# Patient Record
Sex: MALE | Race: WHITE | NOT HISPANIC OR LATINO | Employment: PART TIME | URBAN - METROPOLITAN AREA
[De-identification: names, ages, dates, MRNs, and addresses within clinical notes are randomized per-mention and may not be internally consistent; named-entity substitution may affect disease eponyms.]

---

## 2021-01-15 DIAGNOSIS — Z23 NEED FOR VACCINATION: ICD-10-CM

## 2024-01-06 ENCOUNTER — APPOINTMENT (OUTPATIENT)
Dept: RADIOLOGY | Facility: MEDICAL CENTER | Age: 79
End: 2024-01-06
Attending: EMERGENCY MEDICINE
Payer: COMMERCIAL

## 2024-01-06 ENCOUNTER — HOSPITAL ENCOUNTER (EMERGENCY)
Facility: MEDICAL CENTER | Age: 79
End: 2024-01-06
Attending: EMERGENCY MEDICINE
Payer: COMMERCIAL

## 2024-01-06 VITALS
TEMPERATURE: 98.6 F | DIASTOLIC BLOOD PRESSURE: 99 MMHG | WEIGHT: 250 LBS | OXYGEN SATURATION: 94 % | BODY MASS INDEX: 32.08 KG/M2 | HEIGHT: 74 IN | SYSTOLIC BLOOD PRESSURE: 165 MMHG | HEART RATE: 54 BPM | RESPIRATION RATE: 16 BRPM

## 2024-01-06 DIAGNOSIS — S82.391A OTHER CLOSED FRACTURE OF DISTAL END OF RIGHT TIBIA, INITIAL ENCOUNTER: Primary | ICD-10-CM
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
## 2024-01-06 DIAGNOSIS — S93.04XA ANKLE DISLOCATION, RIGHT, INITIAL ENCOUNTER: ICD-10-CM

## 2024-01-06 PROCEDURE — 73610 X-RAY EXAM OF ANKLE: CPT | Mod: RT

## 2024-01-06 PROCEDURE — 27840 TREAT ANKLE DISLOCATION: CPT

## 2024-01-06 PROCEDURE — 73590 X-RAY EXAM OF LOWER LEG: CPT | Mod: RT

## 2024-01-06 PROCEDURE — 27788 TREATMENT OF ANKLE FRACTURE: CPT

## 2024-01-06 PROCEDURE — 29125 APPL SHORT ARM SPLINT STATIC: CPT

## 2024-01-06 PROCEDURE — 99284 EMERGENCY DEPT VISIT MOD MDM: CPT | Mod: 25

## 2024-01-06 RX ORDER — ACETAMINOPHEN 500 MG
1000 TABLET ORAL EVERY 6 HOURS PRN
Qty: 20 TABLET | Refills: 0 | Status: SHIPPED | OUTPATIENT
Start: 2024-01-06 | End: 2024-01-10

## 2024-01-06 RX ORDER — PROPOFOL 10 MG/ML
200 INJECTION, EMULSION INTRAVENOUS ONCE
Status: DISCONTINUED | OUTPATIENT
Start: 2024-01-06 | End: 2024-01-06

## 2024-01-06 RX ORDER — SODIUM CHLORIDE 9 MG/ML
500 INJECTION, SOLUTION INTRAVENOUS
OUTPATIENT
Start: 2024-01-18 | End: 2024-01-18

## 2024-01-06 RX ORDER — IBUPROFEN 800 MG/1
800 TABLET ORAL EVERY 8 HOURS PRN
Qty: 9 TABLET | Refills: 0 | Status: SHIPPED | OUTPATIENT
Start: 2024-01-06 | End: 2024-01-09

## 2024-01-07 NOTE — ED TRIAGE NOTES
Chief Complaint   Patient presents with    T-5000 GLF     Pt bib ems c/o right ankle pain after slipped and fell on ice while going downstairs. -head trauma. +swelling  +deformity of right ankle. Cms intact. +pedal pulses. Aaox4. Unable to ambulate after the fall. C/o numbness in right ankle. Denies any pain upon arrival. States took 3 asa at 11am

## 2024-01-07 NOTE — ED NOTES
Bedside report received from off going RN Cally, assumed care of patient.  POC discussed with patient. Call light within reach, all needs addressed at this time.     Pt  currently being discharged no interventions in place.

## 2024-01-07 NOTE — DISCHARGE INSTRUCTIONS
Unfortunately you have a fairly bad fracture to your ankle.  It is important that you call the orthopedic doctor on Monday morning to make an appointment to be seen for surgical consultation.  The steps of fractures may require surgery.  I want you to take 1000 mg of Tylenol 3 times a day and 800 mg of Motrin 3 times a day.  You can keep the leg elevated to help with swelling and pain.  You need to follow-up with primary care for further evaluation as well.  Return if symptoms worsen.  Thank you for coming in today.

## 2024-01-07 NOTE — ED PROVIDER NOTES
ED Provider Note    Scribed for Alan Ocasio by Aure Huber. 1/6/2024  4:28 PM    Primary care provider: No primary care provider noted  Means of arrival: EMS  History obtained from: Patient  History limited by: None    CHIEF COMPLAINT  Chief Complaint   Patient presents with    T-5000 GLF     Pt bib ems c/o right ankle pain after slipped and fell on ice while going downstairs. -head trauma. +swelling  +deformity of right ankle. Cms intact. +pedal pulses. Aaox4. Unable to ambulate after the fall. C/o numbness in right ankle. Denies any pain upon arrival. States took 3 asa at 11am       EXTERNAL RECORDS REVIEWED  None    HPI/ROS  LIMITATION TO HISTORY   Select: : None  OUTSIDE HISTORIAN(S):  None    HPI  Blu Dill is a 78 y.o. male who presents to the Emergency Department via EMS for evaluation following a ground level fall. The patient notes he slipped and fell on ice while going downstairs. He denies hitting his head. He notes his only injury was twisting his right ankle. He notes swelling and deformity to his right ankle. He notes he can still move his foot. He notes his ankle feels numb and he doesn't feel pain to he area. He notes taking aspirin along with his other daily medications this morning. There are no known alleviating or exacerbating factors.  He notes he drinks alcohol and smokes cigarettes. He denies drug use.     REVIEW OF SYSTEMS  As above, all other systems reviewed and are negative.   See HPI for further details.     PAST MEDICAL HISTORY   Denies pertinent medical history.     SURGICAL HISTORY  patient denies any surgical history  SOCIAL HISTORY      Social History     Substance and Sexual Activity   Drug Use Not noted     FAMILY HISTORY  No family history noted    CURRENT MEDICATIONS  Home Medications    **Home medications have not yet been reviewed for this encounter**       ALLERGIES  Not on File    PHYSICAL EXAM    VITAL SIGNS:   Vitals:    01/06/24 1616 01/06/24 1620 01/06/24 1701  "  BP: (!) 171/104  (!) 165/99   Pulse: (!) 50  (!) 54   Resp: 16  16   Temp: 37 °C (98.6 °F)  37 °C (98.6 °F)   TempSrc: Temporal  Temporal   SpO2: 97%  94%   Weight:  113 kg (250 lb)    Height:  1.88 m (6' 2\")        Vitals: My interpretation: hypertensive, bradycardic, afebrile, not hypoxic    Reinterpretation of vitals: Improving    PE:   Gen: sitting comfortably, speaking clearly, appears in no acute distress   ENT: Mucous membranes moist, posterior pharynx clear, uvula midline, nares patent bilaterally   Neck: Supple, FROM  Pulmonary: Lungs are clear to auscultation bilaterally. No tachypnea  CV:  RRR, no murmur appreciated, pulses 2+ in both upper and lower extremities. Normal capillary refill.   Abdomen: soft, NT/ND; no rebound/guarding  : no CVA or suprapubic tenderness   Musculoskeletal: Tenderness over the medial and lateral aspect of the malleolar region of his right ankle. Tenderness but range of motion and distal pedal pulses intact.   Neuro: A&Ox4 (person, place, time, situation), speech fluent, gait steady, no focal deficits appreciated  Skin: No rash or lesions.  No pallor or jaundice.  No cyanosis.  Warm and dry.       DIAGNOSTIC STUDIES / PROCEDURES    RADIOLOGY  I have independently interpreted the diagnostic imaging associated with this visit and am waiting the final reading from the radiologist.   My preliminary interpretation is a follows: distal fibula fracture with mortise displacement  Radiologist interpretation is as follows:  DX-TIBIA AND FIBULA RIGHT   Final Result      Improved alignment of ankle fracture subluxation status post reduction.      No proximal tibial or fibular fracture.      DX-ANKLE 3+ VIEWS RIGHT   Final Result      1.  Improved alignment of distal fibular fracture and between the tibia and talus is noted compared to prior radiograph.      2.  No new fracture or malalignment.      DX-ANKLE 3+ VIEWS RIGHT   Final Result      1.  Acute displaced fracture of the distal " "fibula.      2.  Marked malalignment between the talar dome and tibia as described above.        Fracture Reduction        Performed by: Alan Ocasio MD      Consent: Verbal consent obtained.      Risks and benefits: risks, benefits and alternatives were discussed      Consent given by: patient and/or guardian      Patient understanding: patient/guardian states understanding of the procedure being performed      Patient consent: the patient/guardian's understanding of the procedure matches consent given      Patient identity confirmed: verbally with patient and arm band      Time out: Immediately prior to procedure a \"time out\" was called to verify the correct patient, procedure, equipment, support staff and site/side marked as required.      Location details: right distal fibula      Reduction Procedure: axial pull, traction and closed manipulation      Results: Post reduction alignment improved      Complication: Tolerated well without complication. Tendons intact and neurovascularly intact post procedure.    Splint Application and Check        Authorized by: Alan Ocasio       Consent: Verbal consent obtained.      Risks and benefits: risks, benefits and alternatives were discussed      Consent given by: patient      Patient understanding: patient states understanding of the procedure being performed      Patient consent: the patient's understanding of the procedure matches consent given      Patient identity confirmed: verbally with patient and arm band      Time out: Immediately prior to procedure a \"time out\" was called to verify the correct patient, procedure, equipment, support staff and site/side marked as required.      Location details: right distal fibula      Post-procedure: The splinted body part was neurovascularly unchanged following the procedure.      Patient tolerance: Patient tolerated the procedure well with no immediate complications.     COURSE & MEDICAL DECISION MAKING  Nursing " notes, VS, PMSFHx, labs, imaging, EKG reviewed in chart.    ED Observation Status? No; Patient does not meet criteria for ED Observation.     Ddx: Strain, sprain, fracture, dislocation    MDM: 4:28 PM Blu Dill is a 78 y.o. male who presented with isolated ankle injury.  Patient slipped on the ice as it is snowing outside.  Twisted his right ankle.  Brought in by EMS who provide helpful collateral formation.  They report there is moderate deformity to the ankle and obvious pain but pulses are intact.  Upon arrival here patient has hypertension but otherwise unremarkable vital signs.  He denies head or lose conscious.  Reports no other trauma.  Ankle shows intact pulses but obvious deformity and concerns for fracture dislocation.  Initial ankle x-ray my independent interpretation shows obvious distal fibular fracture and malalignment of the ankle mortise.  Thankfully patient was able to tolerate reduction and splinting at bedside without sedation which was done by myself, see procedure note.  Traction was applied and ankle manipulated into alignment and splinted with plaster by myself.  Repeat x-ray shows improved anatomical alignment of prior ankle fracture dislocation.  Radiologist agrees.  Patient tolerated well.  He will be given crutches, Tylenol, Motrin prescriptions, and orthopedic follow-up for further evaluation and surgical consultation.    Patient has had high blood pressure while in the emergency department, felt likely secondary to medical condition. Counseled patient to monitor blood pressure at home and follow up with primary care physician.    ADDITIONAL PROBLEM LIST AND DISPOSITION    I have discussed management of the patient with the following physicians and MEKA's:  None    Discussion of management with other QHP or appropriate source(s): None     Escalation of care considered, and ultimately not performed:acute inpatient care management, however at this time, the patient is most appropriate  for outpatient management    Barriers to care at this time, including but not limited to: Patient does not have established PCP.     Decision tools and prescription drugs considered including, but not limited to: Pain Medications Tylenol and Ibuprofen .    FINAL IMPRESSION  1. Other closed fracture of distal end of right tibia, initial encounter Acute   2. Ankle dislocation, right, initial encounter Acute   3. Fall from ground level Acute      Aure BAPTISTE (Scribe), am scribing for, and in the presence of, Alan Ocasio.    Electronically signed by: Aure Huber (Scribe), 1/6/2024    IAlan personally performed the services described in this documentation, as scribed by Aure Huber in my presence, and it is both accurate and complete.    The note accurately reflects work and decisions made by me.  Alan Ocasio  1/6/2024  5:37 PM

## 2024-01-07 NOTE — DISCHARGE PLANNING
SW met bedside with Pt to discuss transportation options.  Pt stated that he would feel comfortable going home in a cab.  Cab voucher provided to home address ([phone])

## 2024-01-07 NOTE — ED NOTES
Discharge teaching with paperwork regarding follow up with orthopedics and pain management provided to pt. Pt verbalized understanding of teaching and all questions answered. Pt is A&Ox4 with stable vital signs, stable physical assessment, and PIV removed with catheter intact upon discharge. Pt out of ED with via wheelchair with all personal belongings. Cab called for pt and pt provided with cab voucher home.

## 2024-01-07 NOTE — ED NOTES
Assisted Dr. Ocasio in applying a plaster cast splint to patient injured extremity. CMS checked both before and after splint application. Dr. Ocasio provided patient education  on use and care of splint.

## 2024-01-17 ENCOUNTER — APPOINTMENT (OUTPATIENT)
Dept: ADMISSIONS | Facility: MEDICAL CENTER | Age: 79
End: 2024-01-17
Attending: ORTHOPAEDIC SURGERY
Payer: COMMERCIAL

## 2024-01-18 ENCOUNTER — APPOINTMENT (OUTPATIENT)
Dept: RADIOLOGY | Facility: MEDICAL CENTER | Age: 79
End: 2024-01-18
Attending: ORTHOPAEDIC SURGERY
Payer: COMMERCIAL

## 2024-01-18 ENCOUNTER — ANESTHESIA (OUTPATIENT)
Dept: SURGERY | Facility: MEDICAL CENTER | Age: 79
End: 2024-01-18
Payer: COMMERCIAL

## 2024-01-18 ENCOUNTER — ANESTHESIA EVENT (OUTPATIENT)
Dept: SURGERY | Facility: MEDICAL CENTER | Age: 79
End: 2024-01-18
Payer: COMMERCIAL

## 2024-01-18 ENCOUNTER — HOSPITAL ENCOUNTER (OUTPATIENT)
Facility: MEDICAL CENTER | Age: 79
End: 2024-01-19
Attending: ORTHOPAEDIC SURGERY | Admitting: ORTHOPAEDIC SURGERY
Payer: COMMERCIAL

## 2024-01-18 DIAGNOSIS — G89.18 ACUTE POSTOPERATIVE PAIN: ICD-10-CM

## 2024-01-18 DIAGNOSIS — S82.841A BIMALLEOLAR ANKLE FRACTURE, RIGHT, CLOSED, INITIAL ENCOUNTER: ICD-10-CM

## 2024-01-18 PROBLEM — I10 HTN (HYPERTENSION): Status: ACTIVE | Noted: 2024-01-18

## 2024-01-18 PROBLEM — Z72.0 TOBACCO ABUSE: Status: ACTIVE | Noted: 2024-01-18

## 2024-01-18 PROBLEM — I25.10 CAD (CORONARY ARTERY DISEASE): Status: ACTIVE | Noted: 2024-01-18

## 2024-01-18 PROBLEM — I50.9 CHF (CONGESTIVE HEART FAILURE) (HCC): Status: ACTIVE | Noted: 2024-01-18

## 2024-01-18 LAB
ALBUMIN SERPL BCP-MCNC: 4.4 G/DL (ref 3.2–4.9)
ALBUMIN/GLOB SERPL: 1.5 G/DL
ALP SERPL-CCNC: 57 U/L (ref 30–99)
ALT SERPL-CCNC: 9 U/L (ref 2–50)
ANION GAP SERPL CALC-SCNC: 13 MMOL/L (ref 7–16)
AST SERPL-CCNC: 11 U/L (ref 12–45)
BILIRUB SERPL-MCNC: 0.4 MG/DL (ref 0.1–1.5)
BUN SERPL-MCNC: 19 MG/DL (ref 8–22)
CALCIUM ALBUM COR SERPL-MCNC: 8.6 MG/DL (ref 8.5–10.5)
CALCIUM SERPL-MCNC: 8.9 MG/DL (ref 8.5–10.5)
CHLORIDE SERPL-SCNC: 109 MMOL/L (ref 96–112)
CO2 SERPL-SCNC: 18 MMOL/L (ref 20–33)
CREAT SERPL-MCNC: 1.36 MG/DL (ref 0.5–1.4)
EKG IMPRESSION: NORMAL
ERYTHROCYTE [DISTWIDTH] IN BLOOD BY AUTOMATED COUNT: 43.4 FL (ref 35.9–50)
GFR SERPLBLD CREATININE-BSD FMLA CKD-EPI: 53 ML/MIN/1.73 M 2
GLOBULIN SER CALC-MCNC: 2.9 G/DL (ref 1.9–3.5)
GLUCOSE SERPL-MCNC: 98 MG/DL (ref 65–99)
HCT VFR BLD AUTO: 41.4 % (ref 42–52)
HGB BLD-MCNC: 14 G/DL (ref 14–18)
MCH RBC QN AUTO: 31.7 PG (ref 27–33)
MCHC RBC AUTO-ENTMCNC: 33.8 G/DL (ref 32.3–36.5)
MCV RBC AUTO: 93.9 FL (ref 81.4–97.8)
PLATELET # BLD AUTO: 201 K/UL (ref 164–446)
PMV BLD AUTO: 9.2 FL (ref 9–12.9)
POTASSIUM SERPL-SCNC: 4 MMOL/L (ref 3.6–5.5)
PROT SERPL-MCNC: 7.3 G/DL (ref 6–8.2)
RBC # BLD AUTO: 4.41 M/UL (ref 4.7–6.1)
SODIUM SERPL-SCNC: 140 MMOL/L (ref 135–145)
WBC # BLD AUTO: 8.5 K/UL (ref 4.8–10.8)

## 2024-01-18 PROCEDURE — 160035 HCHG PACU - 1ST 60 MINS PHASE I: Performed by: ORTHOPAEDIC SURGERY

## 2024-01-18 PROCEDURE — 700102 HCHG RX REV CODE 250 W/ 637 OVERRIDE(OP): Performed by: ANESTHESIOLOGY

## 2024-01-18 PROCEDURE — G0378 HOSPITAL OBSERVATION PER HR: HCPCS

## 2024-01-18 PROCEDURE — 93010 ELECTROCARDIOGRAM REPORT: CPT | Performed by: INTERNAL MEDICINE

## 2024-01-18 PROCEDURE — 73610 X-RAY EXAM OF ANKLE: CPT | Mod: RT

## 2024-01-18 PROCEDURE — 700102 HCHG RX REV CODE 250 W/ 637 OVERRIDE(OP): Performed by: ORTHOPAEDIC SURGERY

## 2024-01-18 PROCEDURE — 160029 HCHG SURGERY MINUTES - 1ST 30 MINS LEVEL 4: Performed by: ORTHOPAEDIC SURGERY

## 2024-01-18 PROCEDURE — 160041 HCHG SURGERY MINUTES - EA ADDL 1 MIN LEVEL 4: Performed by: ORTHOPAEDIC SURGERY

## 2024-01-18 PROCEDURE — 700105 HCHG RX REV CODE 258: Performed by: ORTHOPAEDIC SURGERY

## 2024-01-18 PROCEDURE — 93005 ELECTROCARDIOGRAM TRACING: CPT | Performed by: ORTHOPAEDIC SURGERY

## 2024-01-18 PROCEDURE — A9270 NON-COVERED ITEM OR SERVICE: HCPCS | Performed by: ANESTHESIOLOGY

## 2024-01-18 PROCEDURE — 700101 HCHG RX REV CODE 250: Performed by: ORTHOPAEDIC SURGERY

## 2024-01-18 PROCEDURE — C1713 ANCHOR/SCREW BN/BN,TIS/BN: HCPCS | Performed by: ORTHOPAEDIC SURGERY

## 2024-01-18 PROCEDURE — 160002 HCHG RECOVERY MINUTES (STAT): Performed by: ORTHOPAEDIC SURGERY

## 2024-01-18 PROCEDURE — 700111 HCHG RX REV CODE 636 W/ 250 OVERRIDE (IP): Mod: JZ | Performed by: ANESTHESIOLOGY

## 2024-01-18 PROCEDURE — 700105 HCHG RX REV CODE 258: Performed by: ANESTHESIOLOGY

## 2024-01-18 PROCEDURE — 700111 HCHG RX REV CODE 636 W/ 250 OVERRIDE (IP): Performed by: ORTHOPAEDIC SURGERY

## 2024-01-18 PROCEDURE — 160048 HCHG OR STATISTICAL LEVEL 1-5: Performed by: ORTHOPAEDIC SURGERY

## 2024-01-18 PROCEDURE — 80053 COMPREHEN METABOLIC PANEL: CPT

## 2024-01-18 PROCEDURE — 96365 THER/PROPH/DIAG IV INF INIT: CPT | Mod: XU

## 2024-01-18 PROCEDURE — A9270 NON-COVERED ITEM OR SERVICE: HCPCS | Performed by: ORTHOPAEDIC SURGERY

## 2024-01-18 PROCEDURE — 160009 HCHG ANES TIME/MIN: Performed by: ORTHOPAEDIC SURGERY

## 2024-01-18 PROCEDURE — 700111 HCHG RX REV CODE 636 W/ 250 OVERRIDE (IP): Performed by: ANESTHESIOLOGY

## 2024-01-18 PROCEDURE — 85027 COMPLETE CBC AUTOMATED: CPT

## 2024-01-18 DEVICE — SCREW CORT 3.5X16MM ST HEX (4TX6+1TX4+1TX3=31)(SDS=22): Type: IMPLANTABLE DEVICE | Site: ANKLE | Status: FUNCTIONAL

## 2024-01-18 DEVICE — SCREW LOCK 3.5X18MM ST T15 - (4SFLX6+LPPELVX4=28): Type: IMPLANTABLE DEVICE | Site: ANKLE | Status: FUNCTIONAL

## 2024-01-18 DEVICE — PLATE LCP 1/3 TUBULAR 10-H (4SFLX1=4): Type: IMPLANTABLE DEVICE | Site: ANKLE | Status: FUNCTIONAL

## 2024-01-18 DEVICE — SCREW CORT 3.5X14MM ST HEX (4TX6+1TX4+1TX3=31)(SDS=22): Type: IMPLANTABLE DEVICE | Site: ANKLE | Status: FUNCTIONAL

## 2024-01-18 DEVICE — SCREW LOCK 3.5X14MM ST T15 - (4SFLX6+LPPELVX4=28): Type: IMPLANTABLE DEVICE | Site: ANKLE | Status: FUNCTIONAL

## 2024-01-18 DEVICE — IMPLANTABLE DEVICE: Type: IMPLANTABLE DEVICE | Site: ANKLE | Status: FUNCTIONAL

## 2024-01-18 DEVICE — SCREW CORT 3.5X60MM ST HEX - (4SFLX6+MDFTX3=27)(SDS=4): Type: IMPLANTABLE DEVICE | Site: ANKLE | Status: FUNCTIONAL

## 2024-01-18 RX ORDER — DOCUSATE SODIUM 100 MG/1
100 CAPSULE, LIQUID FILLED ORAL 2 TIMES DAILY
Status: DISCONTINUED | OUTPATIENT
Start: 2024-01-18 | End: 2024-01-19 | Stop reason: HOSPADM

## 2024-01-18 RX ORDER — ATORVASTATIN CALCIUM 10 MG/1
80 TABLET, FILM COATED ORAL NIGHTLY
COMMUNITY

## 2024-01-18 RX ORDER — LAMOTRIGINE 100 MG/1
200 TABLET ORAL 2 TIMES DAILY
COMMUNITY

## 2024-01-18 RX ORDER — IPRATROPIUM BROMIDE AND ALBUTEROL SULFATE 2.5; .5 MG/3ML; MG/3ML
3 SOLUTION RESPIRATORY (INHALATION)
Status: DISCONTINUED | OUTPATIENT
Start: 2024-01-18 | End: 2024-01-18 | Stop reason: HOSPADM

## 2024-01-18 RX ORDER — CEFAZOLIN SODIUM 1 G/3ML
INJECTION, POWDER, FOR SOLUTION INTRAMUSCULAR; INTRAVENOUS PRN
Status: DISCONTINUED | OUTPATIENT
Start: 2024-01-18 | End: 2024-01-18 | Stop reason: SURG

## 2024-01-18 RX ORDER — LAMOTRIGINE 100 MG/1
200 TABLET ORAL 2 TIMES DAILY
Status: DISCONTINUED | OUTPATIENT
Start: 2024-01-18 | End: 2024-01-19 | Stop reason: HOSPADM

## 2024-01-18 RX ORDER — OXYCODONE HYDROCHLORIDE AND ACETAMINOPHEN 5; 325 MG/1; MG/1
1 TABLET ORAL EVERY 6 HOURS PRN
Qty: 28 TABLET | Refills: 0 | Status: SHIPPED | OUTPATIENT
Start: 2024-01-18 | End: 2024-01-26

## 2024-01-18 RX ORDER — CELECOXIB 200 MG/1
200 CAPSULE ORAL
Status: DISCONTINUED | OUTPATIENT
Start: 2024-01-24 | End: 2024-01-19 | Stop reason: HOSPADM

## 2024-01-18 RX ORDER — LOPERAMIDE HYDROCHLORIDE 2 MG/1
2 CAPSULE ORAL ONCE
Status: COMPLETED | OUTPATIENT
Start: 2024-01-19 | End: 2024-01-19

## 2024-01-18 RX ORDER — KETOROLAC TROMETHAMINE 30 MG/ML
INJECTION, SOLUTION INTRAMUSCULAR; INTRAVENOUS PRN
Status: DISCONTINUED | OUTPATIENT
Start: 2024-01-18 | End: 2024-01-18 | Stop reason: SURG

## 2024-01-18 RX ORDER — DIPHENHYDRAMINE HYDROCHLORIDE 50 MG/ML
12.5 INJECTION INTRAMUSCULAR; INTRAVENOUS
Status: DISCONTINUED | OUTPATIENT
Start: 2024-01-18 | End: 2024-01-18 | Stop reason: HOSPADM

## 2024-01-18 RX ORDER — TRAZODONE HYDROCHLORIDE 100 MG/1
100 TABLET ORAL NIGHTLY
COMMUNITY

## 2024-01-18 RX ORDER — ONDANSETRON 2 MG/ML
INJECTION INTRAMUSCULAR; INTRAVENOUS PRN
Status: DISCONTINUED | OUTPATIENT
Start: 2024-01-18 | End: 2024-01-18 | Stop reason: SURG

## 2024-01-18 RX ORDER — LORATADINE 10 MG/1
10 TABLET ORAL DAILY
COMMUNITY

## 2024-01-18 RX ORDER — ACETAMINOPHEN 500 MG
1000 TABLET ORAL ONCE
Status: COMPLETED | OUTPATIENT
Start: 2024-01-18 | End: 2024-01-18

## 2024-01-18 RX ORDER — SODIUM CHLORIDE, SODIUM LACTATE, POTASSIUM CHLORIDE, CALCIUM CHLORIDE 600; 310; 30; 20 MG/100ML; MG/100ML; MG/100ML; MG/100ML
INJECTION, SOLUTION INTRAVENOUS CONTINUOUS
Status: DISCONTINUED | OUTPATIENT
Start: 2024-01-18 | End: 2024-01-18 | Stop reason: HOSPADM

## 2024-01-18 RX ORDER — ACETAMINOPHEN 325 MG/1
650 TABLET ORAL EVERY 6 HOURS
Status: DISCONTINUED | OUTPATIENT
Start: 2024-01-18 | End: 2024-01-19 | Stop reason: HOSPADM

## 2024-01-18 RX ORDER — OMEPRAZOLE 20 MG/1
20 CAPSULE, DELAYED RELEASE ORAL DAILY
Status: DISCONTINUED | OUTPATIENT
Start: 2024-01-19 | End: 2024-01-19 | Stop reason: HOSPADM

## 2024-01-18 RX ORDER — BUPIVACAINE HYDROCHLORIDE AND EPINEPHRINE 5; 5 MG/ML; UG/ML
INJECTION, SOLUTION EPIDURAL; INTRACAUDAL; PERINEURAL
Status: DISCONTINUED | OUTPATIENT
Start: 2024-01-18 | End: 2024-01-18 | Stop reason: HOSPADM

## 2024-01-18 RX ORDER — SODIUM CHLORIDE, SODIUM LACTATE, POTASSIUM CHLORIDE, CALCIUM CHLORIDE 600; 310; 30; 20 MG/100ML; MG/100ML; MG/100ML; MG/100ML
INJECTION, SOLUTION INTRAVENOUS
Status: DISCONTINUED | OUTPATIENT
Start: 2024-01-18 | End: 2024-01-18 | Stop reason: SURG

## 2024-01-18 RX ORDER — BISACODYL 10 MG
10 SUPPOSITORY, RECTAL RECTAL
Status: DISCONTINUED | OUTPATIENT
Start: 2024-01-18 | End: 2024-01-19 | Stop reason: HOSPADM

## 2024-01-18 RX ORDER — OXYCODONE HYDROCHLORIDE 5 MG/1
10 TABLET ORAL
Status: DISCONTINUED | OUTPATIENT
Start: 2024-01-18 | End: 2024-01-19 | Stop reason: HOSPADM

## 2024-01-18 RX ORDER — HALOPERIDOL 5 MG/ML
1 INJECTION INTRAMUSCULAR
Status: DISCONTINUED | OUTPATIENT
Start: 2024-01-18 | End: 2024-01-18 | Stop reason: HOSPADM

## 2024-01-18 RX ORDER — SODIUM CHLORIDE, SODIUM LACTATE, POTASSIUM CHLORIDE, CALCIUM CHLORIDE 600; 310; 30; 20 MG/100ML; MG/100ML; MG/100ML; MG/100ML
INJECTION, SOLUTION INTRAVENOUS CONTINUOUS
Status: ACTIVE | OUTPATIENT
Start: 2024-01-18 | End: 2024-01-18

## 2024-01-18 RX ORDER — LORATADINE 10 MG/1
10 TABLET ORAL EVERY EVENING
Status: DISCONTINUED | OUTPATIENT
Start: 2024-01-18 | End: 2024-01-19 | Stop reason: HOSPADM

## 2024-01-18 RX ORDER — OMEPRAZOLE 20 MG/1
20 CAPSULE, DELAYED RELEASE ORAL DAILY
COMMUNITY

## 2024-01-18 RX ORDER — POLYETHYLENE GLYCOL 3350 17 G/17G
1 POWDER, FOR SOLUTION ORAL 2 TIMES DAILY PRN
Status: DISCONTINUED | OUTPATIENT
Start: 2024-01-18 | End: 2024-01-19 | Stop reason: HOSPADM

## 2024-01-18 RX ORDER — ATENOLOL 50 MG/1
50 TABLET ORAL DAILY
COMMUNITY

## 2024-01-18 RX ORDER — CELECOXIB 200 MG/1
200 CAPSULE ORAL DAILY
Status: DISCONTINUED | OUTPATIENT
Start: 2024-01-19 | End: 2024-01-19 | Stop reason: HOSPADM

## 2024-01-18 RX ORDER — OXYCODONE HCL 5 MG/5 ML
10 SOLUTION, ORAL ORAL
Status: COMPLETED | OUTPATIENT
Start: 2024-01-18 | End: 2024-01-18

## 2024-01-18 RX ORDER — ENEMA 19; 7 G/133ML; G/133ML
1 ENEMA RECTAL
Status: DISCONTINUED | OUTPATIENT
Start: 2024-01-18 | End: 2024-01-19 | Stop reason: HOSPADM

## 2024-01-18 RX ORDER — ACETAMINOPHEN 325 MG/1
650 TABLET ORAL EVERY 6 HOURS PRN
Status: DISCONTINUED | OUTPATIENT
Start: 2024-01-23 | End: 2024-01-19 | Stop reason: HOSPADM

## 2024-01-18 RX ORDER — ONDANSETRON 2 MG/ML
4 INJECTION INTRAMUSCULAR; INTRAVENOUS
Status: DISCONTINUED | OUTPATIENT
Start: 2024-01-18 | End: 2024-01-18 | Stop reason: HOSPADM

## 2024-01-18 RX ORDER — ATORVASTATIN CALCIUM 80 MG/1
80 TABLET, FILM COATED ORAL NIGHTLY
Status: DISCONTINUED | OUTPATIENT
Start: 2024-01-18 | End: 2024-01-19 | Stop reason: HOSPADM

## 2024-01-18 RX ORDER — DEXAMETHASONE SODIUM PHOSPHATE 4 MG/ML
INJECTION, SOLUTION INTRA-ARTICULAR; INTRALESIONAL; INTRAMUSCULAR; INTRAVENOUS; SOFT TISSUE PRN
Status: DISCONTINUED | OUTPATIENT
Start: 2024-01-18 | End: 2024-01-18 | Stop reason: SURG

## 2024-01-18 RX ORDER — AMOXICILLIN 250 MG
1 CAPSULE ORAL
Status: DISCONTINUED | OUTPATIENT
Start: 2024-01-18 | End: 2024-01-19 | Stop reason: HOSPADM

## 2024-01-18 RX ORDER — TRAZODONE HYDROCHLORIDE 100 MG/1
100 TABLET ORAL NIGHTLY
Status: DISCONTINUED | OUTPATIENT
Start: 2024-01-18 | End: 2024-01-19 | Stop reason: HOSPADM

## 2024-01-18 RX ORDER — ATENOLOL 50 MG/1
50 TABLET ORAL DAILY
Status: DISCONTINUED | OUTPATIENT
Start: 2024-01-19 | End: 2024-01-19 | Stop reason: HOSPADM

## 2024-01-18 RX ORDER — LABETALOL HYDROCHLORIDE 5 MG/ML
5 INJECTION, SOLUTION INTRAVENOUS
Status: DISCONTINUED | OUTPATIENT
Start: 2024-01-18 | End: 2024-01-18 | Stop reason: HOSPADM

## 2024-01-18 RX ORDER — OXYCODONE HYDROCHLORIDE 5 MG/1
5 TABLET ORAL
Status: DISCONTINUED | OUTPATIENT
Start: 2024-01-18 | End: 2024-01-19 | Stop reason: HOSPADM

## 2024-01-18 RX ORDER — HYDROMORPHONE HYDROCHLORIDE 1 MG/ML
0.5 INJECTION, SOLUTION INTRAMUSCULAR; INTRAVENOUS; SUBCUTANEOUS
Status: DISCONTINUED | OUTPATIENT
Start: 2024-01-18 | End: 2024-01-19 | Stop reason: HOSPADM

## 2024-01-18 RX ORDER — HYDRALAZINE HYDROCHLORIDE 20 MG/ML
5 INJECTION INTRAMUSCULAR; INTRAVENOUS
Status: DISCONTINUED | OUTPATIENT
Start: 2024-01-18 | End: 2024-01-18 | Stop reason: HOSPADM

## 2024-01-18 RX ORDER — AMOXICILLIN 250 MG
1 CAPSULE ORAL NIGHTLY
Status: DISCONTINUED | OUTPATIENT
Start: 2024-01-18 | End: 2024-01-18

## 2024-01-18 RX ORDER — EPHEDRINE SULFATE 50 MG/ML
5 INJECTION, SOLUTION INTRAVENOUS
Status: DISCONTINUED | OUTPATIENT
Start: 2024-01-18 | End: 2024-01-18 | Stop reason: HOSPADM

## 2024-01-18 RX ORDER — ONDANSETRON 2 MG/ML
4 INJECTION INTRAMUSCULAR; INTRAVENOUS EVERY 4 HOURS PRN
Status: DISCONTINUED | OUTPATIENT
Start: 2024-01-18 | End: 2024-01-19 | Stop reason: HOSPADM

## 2024-01-18 RX ORDER — ASPIRIN 81 MG/1
81 TABLET ORAL 2 TIMES DAILY
Status: DISCONTINUED | OUTPATIENT
Start: 2024-01-19 | End: 2024-01-19 | Stop reason: HOSPADM

## 2024-01-18 RX ORDER — OXYCODONE HCL 5 MG/5 ML
5 SOLUTION, ORAL ORAL
Status: COMPLETED | OUTPATIENT
Start: 2024-01-18 | End: 2024-01-18

## 2024-01-18 RX ADMIN — OXYCODONE HYDROCHLORIDE 5 MG: 5 SOLUTION ORAL at 18:08

## 2024-01-18 RX ADMIN — PROPOFOL 150 MG: 10 INJECTION, EMULSION INTRAVENOUS at 16:25

## 2024-01-18 RX ADMIN — DEXAMETHASONE SODIUM PHOSPHATE 10 MG: 4 INJECTION INTRA-ARTICULAR; INTRALESIONAL; INTRAMUSCULAR; INTRAVENOUS; SOFT TISSUE at 16:28

## 2024-01-18 RX ADMIN — SODIUM CHLORIDE, POTASSIUM CHLORIDE, SODIUM LACTATE AND CALCIUM CHLORIDE: 600; 310; 30; 20 INJECTION, SOLUTION INTRAVENOUS at 16:20

## 2024-01-18 RX ADMIN — ONDANSETRON 4 MG: 2 INJECTION INTRAMUSCULAR; INTRAVENOUS at 17:28

## 2024-01-18 RX ADMIN — ATORVASTATIN CALCIUM 80 MG: 80 TABLET, FILM COATED ORAL at 20:18

## 2024-01-18 RX ADMIN — LAMOTRIGINE 200 MG: 100 TABLET ORAL at 20:19

## 2024-01-18 RX ADMIN — FENTANYL CITRATE 25 MCG: 50 INJECTION, SOLUTION INTRAMUSCULAR; INTRAVENOUS at 18:30

## 2024-01-18 RX ADMIN — FENTANYL CITRATE 25 MCG: 50 INJECTION, SOLUTION INTRAMUSCULAR; INTRAVENOUS at 18:18

## 2024-01-18 RX ADMIN — KETOROLAC TROMETHAMINE 15 MG: 30 INJECTION, SOLUTION INTRAMUSCULAR; INTRAVENOUS at 17:28

## 2024-01-18 RX ADMIN — FENTANYL CITRATE 25 MCG: 50 INJECTION, SOLUTION INTRAMUSCULAR; INTRAVENOUS at 18:13

## 2024-01-18 RX ADMIN — LORATADINE 10 MG: 10 TABLET ORAL at 20:18

## 2024-01-18 RX ADMIN — CEFAZOLIN 2 G: 2 INJECTION, POWDER, FOR SOLUTION INTRAMUSCULAR; INTRAVENOUS at 23:48

## 2024-01-18 RX ADMIN — HYDRALAZINE HYDROCHLORIDE 5 MG: 20 INJECTION, SOLUTION INTRAMUSCULAR; INTRAVENOUS at 18:19

## 2024-01-18 RX ADMIN — ACETAMINOPHEN 650 MG: 325 TABLET, FILM COATED ORAL at 20:18

## 2024-01-18 RX ADMIN — CEFAZOLIN 2 G: 1 INJECTION, POWDER, FOR SOLUTION INTRAMUSCULAR; INTRAVENOUS at 16:28

## 2024-01-18 RX ADMIN — ACETAMINOPHEN 1000 MG: 500 TABLET, FILM COATED ORAL at 14:15

## 2024-01-18 RX ADMIN — FENTANYL CITRATE 25 MCG: 50 INJECTION, SOLUTION INTRAMUSCULAR; INTRAVENOUS at 18:08

## 2024-01-18 ASSESSMENT — FIBROSIS 4 INDEX: FIB4 SCORE: 1.42

## 2024-01-18 ASSESSMENT — LIFESTYLE VARIABLES
HAVE PEOPLE ANNOYED YOU BY CRITICIZING YOUR DRINKING: NO
TOTAL SCORE: 0
AVERAGE NUMBER OF DAYS PER WEEK YOU HAVE A DRINK CONTAINING ALCOHOL: 3
ALCOHOL_USE: YES
EVER FELT BAD OR GUILTY ABOUT YOUR DRINKING: NO
DOES PATIENT WANT TO STOP DRINKING: NO
HOW MANY TIMES IN THE PAST YEAR HAVE YOU HAD 5 OR MORE DRINKS IN A DAY: 2
TOTAL SCORE: 0
HAVE YOU EVER FELT YOU SHOULD CUT DOWN ON YOUR DRINKING: NO
TOTAL SCORE: 0
CONSUMPTION TOTAL: POSITIVE
ON A TYPICAL DAY WHEN YOU DRINK ALCOHOL HOW MANY DRINKS DO YOU HAVE: 2
EVER HAD A DRINK FIRST THING IN THE MORNING TO STEADY YOUR NERVES TO GET RID OF A HANGOVER: NO

## 2024-01-18 ASSESSMENT — PAIN DESCRIPTION - PAIN TYPE
TYPE: SURGICAL PAIN

## 2024-01-18 ASSESSMENT — PATIENT HEALTH QUESTIONNAIRE - PHQ9
2. FEELING DOWN, DEPRESSED, IRRITABLE, OR HOPELESS: NOT AT ALL
1. LITTLE INTEREST OR PLEASURE IN DOING THINGS: NOT AT ALL
SUM OF ALL RESPONSES TO PHQ9 QUESTIONS 1 AND 2: 0

## 2024-01-18 NOTE — ANESTHESIA PREPROCEDURE EVALUATION
Case: [id number] Date/Time: 01/18/24 2980    Procedure: RIGHT BIMALLEOLAR ANKLE FRACTURE SURGICAL REDUCTION AND FIXATION, OTHER INDICATED PROCEDURES    Pre-op diagnosis: CLOSED BIMALLEOLAR FRACTURE OF RIGHT ANKLE    Location: [facility] OR  / SURGERY [facility]    Surgeons: Tyrell Deal M.D.          77yo M with bimalleolar fx. Here for ORIF    Relevant Problems   CARDIAC   (positive) CAD (coronary artery disease)   (positive) CHF (congestive heart failure) (HCC)   (positive) HTN (hypertension)      Other   (positive) Tobacco abuse (1-2 ppd x 60 years)       Physical Exam    Airway   Mallampati: II  TM distance: >3 FB  Neck ROM: full       Cardiovascular - normal exam  Rhythm: regular  Rate: normal  (-) murmur     Dental - normal exam      Very poor dentition  Facial Hair   Pulmonary - normal exam  Breath sounds clear to auscultation     Abdominal    Neurological - normal exam                   Anesthesia Plan    ASA 3   ASA physical status 3 criteria: COPD - poorly controlled and CAD/stents (> 3 months)    Plan - general       Airway plan will be LMA          Induction: intravenous    Postoperative Plan: Postoperative administration of opioids is intended.    Pertinent diagnostic labs and testing reviewed    Informed Consent:    Anesthetic plan and risks discussed with patient.    Use of blood products discussed with: patient whom consented to blood products.

## 2024-01-19 ENCOUNTER — PHARMACY VISIT (OUTPATIENT)
Dept: PHARMACY | Facility: MEDICAL CENTER | Age: 79
End: 2024-01-19
Payer: COMMERCIAL

## 2024-01-19 VITALS
TEMPERATURE: 97.9 F | HEART RATE: 58 BPM | SYSTOLIC BLOOD PRESSURE: 142 MMHG | HEIGHT: 74 IN | DIASTOLIC BLOOD PRESSURE: 84 MMHG | WEIGHT: 199.3 LBS | OXYGEN SATURATION: 93 % | RESPIRATION RATE: 18 BRPM | BODY MASS INDEX: 25.58 KG/M2

## 2024-01-19 PROCEDURE — 97165 OT EVAL LOW COMPLEX 30 MIN: CPT

## 2024-01-19 PROCEDURE — 700102 HCHG RX REV CODE 250 W/ 637 OVERRIDE(OP): Performed by: ORTHOPAEDIC SURGERY

## 2024-01-19 PROCEDURE — 700102 HCHG RX REV CODE 250 W/ 637 OVERRIDE(OP): Performed by: NURSE PRACTITIONER

## 2024-01-19 PROCEDURE — A9270 NON-COVERED ITEM OR SERVICE: HCPCS | Performed by: NURSE PRACTITIONER

## 2024-01-19 PROCEDURE — 90471 IMMUNIZATION ADMIN: CPT

## 2024-01-19 PROCEDURE — 700111 HCHG RX REV CODE 636 W/ 250 OVERRIDE (IP): Performed by: ORTHOPAEDIC SURGERY

## 2024-01-19 PROCEDURE — G0378 HOSPITAL OBSERVATION PER HR: HCPCS

## 2024-01-19 PROCEDURE — A9270 NON-COVERED ITEM OR SERVICE: HCPCS | Performed by: ORTHOPAEDIC SURGERY

## 2024-01-19 PROCEDURE — RXMED WILLOW AMBULATORY MEDICATION CHARGE: Performed by: ORTHOPAEDIC SURGERY

## 2024-01-19 PROCEDURE — 97162 PT EVAL MOD COMPLEX 30 MIN: CPT

## 2024-01-19 PROCEDURE — 97535 SELF CARE MNGMENT TRAINING: CPT

## 2024-01-19 PROCEDURE — 700105 HCHG RX REV CODE 258: Performed by: ORTHOPAEDIC SURGERY

## 2024-01-19 PROCEDURE — 90662 IIV NO PRSV INCREASED AG IM: CPT | Performed by: ORTHOPAEDIC SURGERY

## 2024-01-19 RX ADMIN — ACETAMINOPHEN 650 MG: 325 TABLET, FILM COATED ORAL at 01:56

## 2024-01-19 RX ADMIN — ACETAMINOPHEN 650 MG: 325 TABLET, FILM COATED ORAL at 08:22

## 2024-01-19 RX ADMIN — CELECOXIB 200 MG: 200 CAPSULE ORAL at 05:16

## 2024-01-19 RX ADMIN — OMEPRAZOLE 20 MG: 20 CAPSULE, DELAYED RELEASE ORAL at 05:16

## 2024-01-19 RX ADMIN — LOPERAMIDE HYDROCHLORIDE 2 MG: 2 CAPSULE ORAL at 00:33

## 2024-01-19 RX ADMIN — INFLUENZA A VIRUS A/VICTORIA/4897/2022 IVR-238 (H1N1) ANTIGEN (FORMALDEHYDE INACTIVATED), INFLUENZA A VIRUS A/DARWIN/9/2021 SAN-010 (H3N2) ANTIGEN (FORMALDEHYDE INACTIVATED), INFLUENZA B VIRUS B/PHUKET/3073/2013 ANTIGEN (FORMALDEHYDE INACTIVATED), AND INFLUENZA B VIRUS B/MICHIGAN/01/2021 ANTIGEN (FORMALDEHYDE INACTIVATED) 0.7 ML: 60; 60; 60; 60 INJECTION, SUSPENSION INTRAMUSCULAR at 05:16

## 2024-01-19 RX ADMIN — LAMOTRIGINE 200 MG: 100 TABLET ORAL at 05:16

## 2024-01-19 RX ADMIN — ASPIRIN 81 MG: 81 TABLET, COATED ORAL at 08:22

## 2024-01-19 RX ADMIN — ATENOLOL 50 MG: 50 TABLET ORAL at 05:16

## 2024-01-19 RX ADMIN — CEFAZOLIN 2 G: 2 INJECTION, POWDER, FOR SOLUTION INTRAMUSCULAR; INTRAVENOUS at 06:06

## 2024-01-19 ASSESSMENT — GAIT ASSESSMENTS
GAIT LEVEL OF ASSIST: SUPERVISED
DISTANCE (FEET): 30
ASSISTIVE DEVICE: FRONT WHEEL WALKER

## 2024-01-19 ASSESSMENT — COGNITIVE AND FUNCTIONAL STATUS - GENERAL
SUGGESTED CMS G CODE MODIFIER DAILY ACTIVITY: CJ
DAILY ACTIVITIY SCORE: 22
WALKING IN HOSPITAL ROOM: A LITTLE
MOVING FROM LYING ON BACK TO SITTING ON SIDE OF FLAT BED: A LITTLE
MOBILITY SCORE: 19
STANDING UP FROM CHAIR USING ARMS: A LITTLE
SUGGESTED CMS G CODE MODIFIER MOBILITY: CK
DRESSING REGULAR LOWER BODY CLOTHING: A LITTLE
CLIMB 3 TO 5 STEPS WITH RAILING: A LOT
HELP NEEDED FOR BATHING: A LITTLE

## 2024-01-19 ASSESSMENT — PAIN DESCRIPTION - PAIN TYPE
TYPE: SURGICAL PAIN

## 2024-01-19 ASSESSMENT — ACTIVITIES OF DAILY LIVING (ADL): TOILETING: INDEPENDENT

## 2024-01-19 NOTE — PROGRESS NOTES
Received report from PACU RN. Assumed care. Pt transferred to bed. Pt resting in bed, A&Ox4, NAD, VSS on 1 L NC. C/o mild pain in the R ankle. CMS intact. POC discussed with patient, all questions addressed. Call light within reach.

## 2024-01-19 NOTE — PROGRESS NOTES
4 Eyes Skin Assessment Completed by KATHY Phillips and KATHY Brito.    Head WDL  Ears WDL  Nose WDL  Mouth WDL  Neck WDL  Breast/Chest WDL  Shoulder Blades WDL  Spine WDL  (R) Arm/Elbow/Hand WDL  (L) Arm/Elbow/Hand WDL  Abdomen WDL  Groin WDL  Scrotum/Coccyx/Buttocks WDL  (R) Leg Surgical dressing, splint, and ace wrap in place. Bruising on R upper calf  (L) Leg WDL  (R) Heel/Foot/Toe Dressing, splint, and ace wrap in place. Bruising on toes. CMS intact  (L) Heel/Foot/Toe WDL          Devices In Places Pulse Ox and Nasal Cannula      Interventions In Place Pillows    Possible Skin Injury No    Pictures Uploaded Into Epic N/A  Wound Consult Placed N/A  RN Wound Prevention Protocol Ordered No

## 2024-01-19 NOTE — PROGRESS NOTES
Pt d/c from [facility] via wheelchair using [employer] services. Paperwork reviewed with pt, signed and scanned, narcotic consent signed, all questions answered. Meds given to pt. No PIV, wristband cut off of pt. All questions answered.

## 2024-01-19 NOTE — CARE PLAN
The patient is Stable - Low risk of patient condition declining or worsening    Shift Goals  Clinical Goals: Pain control, PT/OT  Patient Goals: Comfort, rest  Family Goals: Not at bedside    Progress made toward(s) clinical / shift goals:      Problem: Pain - Standard  Goal: Alleviation of pain or a reduction in pain to the patient’s comfort goal  1/19/2024 0016 by Jacqueline Augustin R.N.  Outcome: Progressing  1/19/2024 0016 by Jacqueline Augustin R.N.  Outcome: Progressing     Problem: Knowledge Deficit - Standard  Goal: Patient and family/care givers will demonstrate understanding of plan of care, disease process/condition, diagnostic tests and medications  1/19/2024 0016 by Jacqueline Augustin R.N.  Outcome: Progressing  1/19/2024 0016 by JERROD Coombs.N.  Outcome: Progressing     Problem: Respiratory  Goal: Patient will achieve/maintain optimum respiratory ventilation and gas exchange  Outcome: Progressing       Patient is not progressing towards the following goals:

## 2024-01-19 NOTE — OR SURGEON
Immediate Post OP Note    PreOp Diagnosis: Right bimalleolar ankle fracture      PostOp Diagnosis: same, distal tibiofibular joint disruption      Procedure(s):  RIGHT BIMALLEOLAR ANKLE FRACTURE SURGICAL REDUCTION AND FIXATION - Wound Class: Clean    Surgeon(s):  Tyrell Deal M.D.    Anesthesiologist/Type of Anesthesia:  Anesthesiologist: Enriqueta Calderon M.D./General    Surgical Staff:  Circulator: Randal Deutsch R.N.  Scrub Person: Reymundo Red  Radiology Technologist: Celestino Vega    Specimens removed if any:  * No specimens in log *    Estimated Blood Loss: minimal    Findings: see dictation    Complications: none known    PLAN:  --admit obs postop  --TTWB RLE in splint  --ancef x 2 doses postop  --PT/OT for mobilization ASAP  --anticipate discharge tomorrow after PT if doing well  --fu 2 weeks postop as scheduled        1/18/2024 5:33 PM Tyrell Deal M.D.

## 2024-01-19 NOTE — ANESTHESIA PROCEDURE NOTES
Airway    Date/Time: 1/18/2024 4:26 PM    Performed by: Enriqueta Calderon M.D.  Authorized by: Enriqueta Calderon M.D.    Location:  OR  Urgency:  Elective  Indications for Airway Management:  Anesthesia      Spontaneous Ventilation: absent    Sedation Level:  Deep  Preoxygenated: Yes    Mask Difficulty Assessment:  0 - not attempted  Final Airway Type:  Supraglottic airway  Final Supraglottic Airway:  Standard LMA    SGA Size:  4  Airway Seal Pressure (cm H2O):  20  Number of Attempts at Approach:  1

## 2024-01-19 NOTE — ANESTHESIA POSTPROCEDURE EVALUATION
Patient: Blu Dill    Procedure Summary       Date: 01/18/24 Room / Location: [place] [address] / SURGERY [facility]    Anesthesia Start: 1618 Anesthesia Stop: 1744    Procedure: RIGHT BIMALLEOLAR ANKLE FRACTURE SURGICAL REDUCTION AND FIXATION (Right: Ankle) Diagnosis: (CLOSED BIMALLEOLAR FRACTURE OF RIGHT ANKLE)    Surgeons: Tyrell Deal M.D. Responsible Provider: Enriqueta Calderon M.D.    Anesthesia Type: general ASA Status: 3            Final Anesthesia Type: general  Last vitals  BP   Blood Pressure : 124/77    Temp   37 °C (98.6 °F)    Pulse   65   Resp   20    SpO2   94 %      Anesthesia Post Evaluation    Patient location during evaluation: PACU  Patient participation: complete - patient participated  Level of consciousness: awake and alert    Airway patency: patent  Anesthetic complications: no  Cardiovascular status: hemodynamically stable  Respiratory status: acceptable  Hydration status: euvolemic    PONV: none          No notable events documented.     Nurse Pain Score: 0 (NPRS)

## 2024-01-19 NOTE — THERAPY
"Occupational Therapy   Initial Evaluation     Patient Name: Blu Dill  Age:  78 y.o., Sex:  male  Medical Record #: [id number]  Today's Date: 1/19/2024     Precautions  Precautions: (P) Fall Risk, Toe Touch Weight Bearing Left Lower Extremity    Assessment  Patient is 78 y.o. male who presents to acute after GLF resulting in right bimalleolar ankle fx and right distal tibiofibular joint disruption, pt is s/p right internal fixation of ankle fx. Pt demo'd BADLs at SPV/SBA level. Extensive ed/training on compensatory stratagies for ADLs, home safety, and WBing status. No further acute OT needs. Pt reports he will refuse HH services or meet them in the lobby due to excessive clutter in his home.     Plan    Occupational Therapy Initial Treatment Plan   Duration: (P) Evaluation only    DC Equipment Recommendations: (P) Tub Transfer Bench  Discharge Recommendations: (P) Recommend home health for continued occupational therapy services (for higher level IADL training, pt lives alone, would benefit from safety assessment in natural environment)     Subjective    \"I have been walking to the bathroom all night, why would I not put weight on my foot?\"     Objective      Initial Contact Note    Initial Contact Note Order Received and Verified, Occupational Therapy Evaluation in Progress with Full Report to Follow.   Prior Living Situation   Prior Services None   Housing / Facility 1 Story Apartment / Condo   Elevator Yes   Bathroom Set up Bathtub / Shower Combination;Grab Bars   Equipment Owned None   Lives with - Patient's Self Care Capacity Alone and Able to Care For Self   Comments Pt lives alone w/ 2 cats. Appears to have limited social supports.  Typically uses a moped to go to the store   Prior Level of ADL Function   Self Feeding Independent   Grooming / Hygiene Independent   Bathing Independent   Dressing Independent   Toileting Independent   Comments reports he has been sponge bathing   Prior Level of IADL " Function   Medication Management Independent   Laundry Independent   Kitchen Mobility Independent   Finances Independent   Home Management Independent   Shopping Independent   Prior Level Of Mobility Independent Without Device in Community;Independent Without Device in Home   Driving / Transportation Driving Independent   Precautions   Precautions Fall Risk;Toe Touch Weight Bearing Left Lower Extremity   Vitals   O2 (LPM) 0   O2 Delivery Device None - Room Air   Pain 0 - 10 Group   Therapist Pain Assessment Post Activity Pain Same as Prior to Activity;Nurse Notified  (c/o left calf cramping, not quantified, agreeable to session)   Cognition    Cognition / Consciousness WDL   Level of Consciousness Alert   Comments cooperative, hyperverbose   Active ROM Upper Body   Active ROM Upper Body  WDL   Strength Upper Body   Upper Body Strength  WDL   Coordination Upper Body   Coordination WDL   Balance Assessment   Sitting Balance (Static) Fair +   Sitting Balance (Dynamic) Fair   Standing Balance (Static) Fair   Standing Balance (Dynamic) Fair -   Weight Shift Sitting Good   Weight Shift Standing Fair   Comments w/ FWW, maintained TTWBing status   Bed Mobility    Supine to Sit Supervised   Sit to Supine Supervised   Scooting Supervised   ADL Assessment   Grooming Supervision;Standing  (oral care at sink)   Lower Body Dressing Supervision  (donned sock and underpants)   Toileting   (declined need)   How much help from another person does the patient currently need...   Putting on and taking off regular lower body clothing? 3   Bathing (including washing, rinsing, and drying)? 3   Toileting, which includes using a toilet, bedpan, or urinal? 4   Putting on and taking off regular upper body clothing? 4   Taking care of personal grooming such as brushing teeth? 4   Eating meals? 4   6 Clicks Daily Activity Score 22   Functional Mobility   Sit to Stand Standby Assist   Bed, Chair, Wheelchair Transfer Standby Assist   Mobility  within room, w/ FWW   Activity Tolerance   Sitting Edge of Bed 10 min   Standing 5 min   Education Group   Role of Occupational Therapist Patient Response Patient;Acceptance;Explanation;Demonstration;Verbal Demonstration;Action Demonstration   ADL Patient Response Patient;Acceptance;Explanation;Demonstration;Verbal Demonstration;Action Demonstration   Adaptive Equipment Patient Response Patient;Explanation;Demonstration;Verbal Demonstration;Action Demonstration   Weight Bearing Precautions Patient Response Patient;Acceptance;Explanation;Demonstration;Verbal Demonstration;Action Demonstration   Occupational Therapy Initial Treatment Plan    Duration Evaluation only   Anticipated Discharge Equipment and Recommendations   DC Equipment Recommendations Tub Transfer Bench   Discharge Recommendations Recommend home health for continued occupational therapy services  (for higher level IADL training, pt lives alone, would benefit from safety assessment in natural environment)   Interdisciplinary Plan of Care Collaboration   IDT Collaboration with  Nursing;   Patient Position at End of Therapy In Bed;Bed Alarm On;Call Light within Reach;Tray Table within Reach;Phone within Reach   Collaboration Comments report given   Session Information   Date / Session Number  1/19, 1x/only

## 2024-01-19 NOTE — OP REPORT
DATE OF SERVICE:  01/18/2024     PREOPERATIVE DIAGNOSES:  1.  Right bimalleolar ankle fracture.  2. Right distal tibiofibular joint disruption.     POSTOPERATIVE DIAGNOSES:  1.  Right bimalleolar ankle fracture.  2. Right distal tibiofibular joint disruption.     PROCEDURES PERFORMED:  1.  Open treatment with internal fixation of right bimalleolar ankle fracture.  2.  Surgical fixation of right distal tibiofibular joint disruption.  3.  Physician applied stress examination right ankle using fluoroscopy.     SURGEON:  Tyrell Deal MD     ANESTHESIOLOGIST:  Enriqueta Calderon MD     ANESTHESIA:  General.     ESTIMATED BLOOD LOSS:  Minimal.     TOURNIQUET TIME:  37 minutes at 250 mmHg to the right thigh.     IMPLANTS:  Synthes 10-hole 1/3rd tubular locking plate with nonlocking and   locking screws and a single 2.7 mm cortical screw outside of the plate.     INDICATIONS FOR PROCEDURE:  The patient is 78 years old and he sustained a   right unstable ankle fracture about 12 days ago.  He was seen in the emergency   department, had a closed reduction and splinting performed.  He was   subsequently discharged and followed up with me yesterday in clinic.  He had   been walking on his right lower extremity in his splint and I recommended   surgical reduction and fixation for this unstable injury.  He signed informed   consent preoperatively and wished to proceed with surgery as outlined above.     DESCRIPTION OF PROCEDURE:  The patient was met in the preoperative holding   area.  His surgical site was signed.  His consent was confirmed to be   accurate.  He was taken back to the operating room.  General anesthesia was   induced.  His splint was removed, which was quite tattered and worn.  There   was some resolving serous blisters diffusely around the ankle, but no evidence   of full-thickness open wound was present.  The right lower extremity was   provisionally cleansed with isopropyl alcohol thoroughly. Tourniquet  was   applied to the right thigh.  The right lower extremity was then prepped and   draped in the usual sterile fashion.  A formal timeout was performed to   confirm the patient's correct name, correct surgical site, correct procedure   and correct laterality.  The limb was exsanguinated with an Esmarch and the   tourniquet was inflated to 250 mmHg.  A lateral incision was made centered   over the fibula fracture with a scalpel down through skin.  Dissection was   carried sharply down to the fracture site.  There was a long posteromedial   metaphyseal spike with some comminution present, but I was able to clear the   fracture site of some soft tissue and reduce it in anatomic alignment with   combination of reduction forceps and placed anterior to posterior 2.7 mm lag   screw to achieve interfragmentary compression.  I then positioned a 10-hole   posterolateral 1/3rd tubular locking plate, clamp it down to bone to achieve   antiglide fixation and bypassed the extent of the posteromedial comminution   with several screw holes.  I fixed it proximally with several bicortical   nonlocking screws distally with several unicortical locking screws.  I then   performed external rotation stress examination as well as a cotton test. The   distal tibiofibular joint was unstable rotationally and I did lateralize and   open up the medial clear space slightly with these test under fluoroscopy and   the small posterior malleolus fracture I did not feel was amenable to open   reduction and fixation.  I therefore reduced the ankle syndesmosis with   lateral pressure on the fibula and drilled and inserted two 3.5 mm   quadricortical syndesmotic screws through the plate.  Final fluoroscopic   imaging then confirmed overall acceptable alignment of the fracture and   acceptable position of the implants.  Wounds were thoroughly irrigated with   normal saline and I repaired the subcutaneous tissue layers with 0 Vicryl, 2-0   Vicryl and  skin edges with staples.  The wounds were thoroughly cleansed and   dried.  A total of 30 mL of 0.5% Marcaine with epinephrine was injected in the   incision and the ankle joint for postop analgesia.  He then was placed into a   sterile dressing and a well-padded short leg plaster splint.  The tourniquet   had been deflated after 37 minutes.  He was awokened from anesthesia,   transferred on the rCitronelle and taken to postanesthesia care unit in stable   condition.     PLAN:  1.  The patient will be admitted for observation postoperatively as he lives   alone and has no ride home or anyone to stay with him overnight.  2.  He should be toe touch weightbearing on the right lower extremity in   splint.  3.  He should work with physical and occupational therapy for mobilization and   gait training and secondary fall prevention.  4.  He will need Ancef for 2 doses postop for infection prophylaxis.  5.  Anticipated discharge home tomorrow if he is otherwise doing well and he   will follow up with me as scheduled 2 weeks postop for routine wound check,   staple removal, x-rays 3 views of the right ankle and conversion to a walking   boot.        ______________________________  MD KRISTIN Myers/ELIJAH/LEONELA    DD:  01/18/2024 17:43  DT:  01/18/2024 18:43    Job#:  [id number]

## 2024-01-19 NOTE — ANESTHESIA TIME REPORT
Anesthesia Start and Stop Event Times       Date Time Event    1/18/2024 1551 Ready for Procedure     1618 Anesthesia Start     1744 Anesthesia Stop          Responsible Staff  01/18/24      Name Role Begin End    Enriqueta Calderon M.D. Anesth 1618 1626          Overtime Reason:  no overtime (within assigned shift)    Comments:

## 2024-01-19 NOTE — THERAPY
"Physical Therapy   Initial Evaluation     Patient Name: Blu Dill  Age:  78 y.o., Sex:  male  Medical Record #: [id number]  Today's Date: 1/19/2024     Precautions  Precautions: Fall Risk;Toe Touch Weight Bearing Right Lower Extremity  Comments: s/p R ankle ORIF    Assessment    Patient is 78 y.o. male who presents to acute after GLF resulting in right bimalleolar ankle fx and right distal tibiofibular joint disruption, pt is s/p right internal fixation of ankle fx.   Patient presents to PT eval with impaired gait 2/2 TTWB precautions. He was able to use the FWW for transfers and household distance gait.  He does not have assist at home but is refusing HHPT.  [facility].  No further acute care PT needs at this time.     Plan    Physical Therapy Initial Treatment Plan   Duration: Discharge Needs Only    DC Equipment Recommendations: Front-Wheel Walker  Discharge Recommendations: Recommend home health for continued physical therapy services       Subjective    \"I'm ready to get out of here\"     Objective       01/19/24 0943   Precautions   Precautions Fall Risk;Toe Touch Weight Bearing Right Lower Extremity   Comments s/p R ankle ORIF   Pain 0 - 10 Group   Location Ankle   Location Orientation Lateral   Therapist Pain Assessment Post Activity Pain Same as Prior to Activity;5   Prior Living Situation   Prior Services None   Housing / Facility 1 Story House   Steps In Home 0   Elevator Yes   Bathroom Set up Bathtub / Shower Combination;Grab Bars   Equipment Owned Crutches   Lives with - Patient's Self Care Capacity Alone and Able to Care For Self   Comments Patient does not report any social support. He drives a moped at baseline. He reports that he will try to order groceries and will try to get an [facility]irc scooter [facility]   Prior Level of Functional Mobility   Bed Mobility Independent   Transfer Status Independent   Ambulation Independent   Assistive " Devices Used None   Comments Prior to his fall 2 weeks ago he was indeo no AD> SInce his fall he has been bearing weight on his L foot and not using the crutches   History of Falls   History of Falls Yes   Date of Last Fall   (reason for admit)   Cognition    Cognition / Consciousness WDL   Level of Consciousness Alert   Comments cooperative but self-directed and oinly partially receptive to education.   Active ROM Upper Body   Active ROM Upper Body  WDL   Strength Upper Body   Upper Body Strength  WDL   Sensation Upper Body   Upper Extremity Sensation  WDL   Active ROM Lower Body    Active ROM Lower Body  X   Comments R LE in cast   Strength Lower Body   Lower Body Strength  X   Comments as above, otherwise WFL   Sensation Lower Body   Lower Extremity Sensation   WDL   Other Treatments   Other Treatments Provided discussed DC recs and possible use of a knee scooter   Balance Assessment   Sitting Balance (Static) Fair +   Sitting Balance (Dynamic) Fair   Standing Balance (Static) Fair   Standing Balance (Dynamic) Fair   Weight Shift Sitting Good   Weight Shift Standing Fair   Comments w/FWW   Bed Mobility    Supine to Sit Supervised   Sit to Supine Supervised   Scooting Supervised   Gait Analysis   Gait Level Of Assist Supervised   Assistive Device Front Wheel Walker   Distance (Feet) 30   # of Times Distance was Traveled 1   Deviation   (hop to)   Weight Bearing Status TTWB R LE   Functional Mobility   Sit to Stand Supervised   Bed, Chair, Wheelchair Transfer Supervised   Comments increased time and repeatec cueing to maintain TTWB   How much difficulty does the patient currently have...   Turning over in bed (including adjusting bedclothes, sheets and blankets)? 4   Sitting down on and standing up from a chair with arms (e.g., wheelchair, bedside commode, etc.) 3   Moving from lying on back to sitting on the side of the bed? 4   How much help from another person does the patient currently need...   Moving to and  from a bed to a chair (including a wheelchair)? 3   Need to walk in a hospital room? 3   Climbing 3-5 steps with a railing? 2   6 clicks Mobility Score 19   Activity Tolerance   Sitting Edge of Bed 10 min   Standing 10 min   Education Group   Education Provided Weight Bearing Status;Role of Physical Therapist;Use of Assistive Device;Gait Training   Role of Physical Therapist Patient Response Patient;Acceptance;Explanation;Demonstration;Verbal Demonstration;Action Demonstration   Gait Training Patient Response Patient;Acceptance;Explanation;Demonstration;Verbal Demonstration;Action Demonstration   Use of Assistive Device Patient Response Patient;Acceptance;Explanation;Demonstration;Verbal Demonstration;Action Demonstration   Weight Bearing Status Patient Response Patient;Acceptance;Explanation;Demonstration;Verbal Demonstration;Action Demonstration   Physical Therapy Initial Treatment Plan    Duration Discharge Needs Only   Anticipated Discharge Equipment and Recommendations   DC Equipment Recommendations Front-Wheel Walker   Discharge Recommendations Recommend home health for continued physical therapy services     Cally Leonard, PT, DPT, GCS

## 2024-01-19 NOTE — PROGRESS NOTES
Patient was seen by PT/OT with reccomendations for home health. Patient refusing home health and would like to discharge. PA aware of conversation while OT was working with the patient. Patient dressed with all belongings on person and IV removed. FWW delivered to the patient.

## 2025-03-05 ENCOUNTER — HOSPITAL ENCOUNTER (EMERGENCY)
Facility: MEDICAL CENTER | Age: 80
End: 2025-03-05
Attending: EMERGENCY MEDICINE
Payer: COMMERCIAL

## 2025-03-05 ENCOUNTER — APPOINTMENT (OUTPATIENT)
Dept: RADIOLOGY | Facility: MEDICAL CENTER | Age: 80
End: 2025-03-05
Attending: EMERGENCY MEDICINE
Payer: COMMERCIAL

## 2025-03-05 VITALS
RESPIRATION RATE: 16 BRPM | DIASTOLIC BLOOD PRESSURE: 87 MMHG | TEMPERATURE: 98.9 F | HEIGHT: 74 IN | HEART RATE: 81 BPM | SYSTOLIC BLOOD PRESSURE: 135 MMHG | OXYGEN SATURATION: 94 % | WEIGHT: 200 LBS | BODY MASS INDEX: 25.67 KG/M2

## 2025-03-05 DIAGNOSIS — R10.9 ABDOMINAL PAIN, UNSPECIFIED ABDOMINAL LOCATION: ICD-10-CM

## 2025-03-05 LAB
ALBUMIN SERPL BCP-MCNC: 4.1 G/DL (ref 3.2–4.9)
ALBUMIN/GLOB SERPL: 1.7 G/DL
ALP SERPL-CCNC: 133 U/L (ref 30–99)
ALT SERPL-CCNC: 449 U/L (ref 2–50)
ANION GAP SERPL CALC-SCNC: 16 MMOL/L (ref 7–16)
APPEARANCE UR: ABNORMAL
AST SERPL-CCNC: 676 U/L (ref 12–45)
BACTERIA #/AREA URNS HPF: NORMAL /HPF
BASOPHILS # BLD AUTO: 0.3 % (ref 0–1.8)
BASOPHILS # BLD: 0.02 K/UL (ref 0–0.12)
BILIRUB SERPL-MCNC: 2.8 MG/DL (ref 0.1–1.5)
BILIRUB UR QL STRIP.AUTO: ABNORMAL
BUN SERPL-MCNC: 13 MG/DL (ref 8–22)
CALCIUM ALBUM COR SERPL-MCNC: 8.7 MG/DL (ref 8.5–10.5)
CALCIUM SERPL-MCNC: 8.8 MG/DL (ref 8.5–10.5)
CASTS URNS QL MICRO: NORMAL /LPF (ref 0–2)
CHLORIDE SERPL-SCNC: 103 MMOL/L (ref 96–112)
CO2 SERPL-SCNC: 17 MMOL/L (ref 20–33)
COLOR UR: ABNORMAL
CREAT SERPL-MCNC: 1.2 MG/DL (ref 0.5–1.4)
EOSINOPHIL # BLD AUTO: 0.02 K/UL (ref 0–0.51)
EOSINOPHIL NFR BLD: 0.3 % (ref 0–6.9)
EPITHELIAL CELLS 1715: NORMAL /HPF (ref 0–5)
ERYTHROCYTE [DISTWIDTH] IN BLOOD BY AUTOMATED COUNT: 39.8 FL (ref 35.9–50)
GFR SERPLBLD CREATININE-BSD FMLA CKD-EPI: 61 ML/MIN/1.73 M 2
GLOBULIN SER CALC-MCNC: 2.4 G/DL (ref 1.9–3.5)
GLUCOSE SERPL-MCNC: 148 MG/DL (ref 65–99)
GLUCOSE UR STRIP.AUTO-MCNC: NEGATIVE MG/DL
HCT VFR BLD AUTO: 44.7 % (ref 42–52)
HGB BLD-MCNC: 15.6 G/DL (ref 14–18)
IMM GRANULOCYTES # BLD AUTO: 0.03 K/UL (ref 0–0.11)
IMM GRANULOCYTES NFR BLD AUTO: 0.4 % (ref 0–0.9)
KETONES UR STRIP.AUTO-MCNC: NEGATIVE MG/DL
LEUKOCYTE ESTERASE UR QL STRIP.AUTO: NEGATIVE
LIPASE SERPL-CCNC: 22 U/L (ref 11–82)
LYMPHOCYTES # BLD AUTO: 0.26 K/UL (ref 1–4.8)
LYMPHOCYTES NFR BLD: 3.4 % (ref 22–41)
MCH RBC QN AUTO: 31.5 PG (ref 27–33)
MCHC RBC AUTO-ENTMCNC: 34.9 G/DL (ref 32.3–36.5)
MCV RBC AUTO: 90.3 FL (ref 81.4–97.8)
MICRO URNS: ABNORMAL
MONOCYTES # BLD AUTO: 0.09 K/UL (ref 0–0.85)
MONOCYTES NFR BLD AUTO: 1.2 % (ref 0–13.4)
NEUTROPHILS # BLD AUTO: 7.24 K/UL (ref 1.82–7.42)
NEUTROPHILS NFR BLD: 94.4 % (ref 44–72)
NITRITE UR QL STRIP.AUTO: NEGATIVE
NRBC # BLD AUTO: 0 K/UL
NRBC BLD-RTO: 0 /100 WBC (ref 0–0.2)
PH UR STRIP.AUTO: 5.5 [PH] (ref 5–8)
PLATELET # BLD AUTO: 149 K/UL (ref 164–446)
PLATELET BLD QL SMEAR: NORMAL
PMV BLD AUTO: 9.7 FL (ref 9–12.9)
POTASSIUM SERPL-SCNC: 3.6 MMOL/L (ref 3.6–5.5)
PROT SERPL-MCNC: 6.5 G/DL (ref 6–8.2)
PROT UR QL STRIP: 30 MG/DL
RBC # BLD AUTO: 4.95 M/UL (ref 4.7–6.1)
RBC # URNS HPF: NORMAL /HPF (ref 0–2)
RBC UR QL AUTO: NEGATIVE
SODIUM SERPL-SCNC: 136 MMOL/L (ref 135–145)
SP GR UR STRIP.AUTO: 1.02
UROBILINOGEN UR STRIP.AUTO-MCNC: 2 EU/DL
WBC # BLD AUTO: 7.7 K/UL (ref 4.8–10.8)
WBC #/AREA URNS HPF: NORMAL /HPF

## 2025-03-05 PROCEDURE — 99285 EMERGENCY DEPT VISIT HI MDM: CPT

## 2025-03-05 PROCEDURE — 700105 HCHG RX REV CODE 258: Performed by: EMERGENCY MEDICINE

## 2025-03-05 PROCEDURE — 74177 CT ABD & PELVIS W/CONTRAST: CPT

## 2025-03-05 PROCEDURE — 96374 THER/PROPH/DIAG INJ IV PUSH: CPT | Mod: XU

## 2025-03-05 PROCEDURE — 81001 URINALYSIS AUTO W/SCOPE: CPT

## 2025-03-05 PROCEDURE — 80053 COMPREHEN METABOLIC PANEL: CPT

## 2025-03-05 PROCEDURE — 700111 HCHG RX REV CODE 636 W/ 250 OVERRIDE (IP): Mod: JZ | Performed by: EMERGENCY MEDICINE

## 2025-03-05 PROCEDURE — 85025 COMPLETE CBC W/AUTO DIFF WBC: CPT

## 2025-03-05 PROCEDURE — 700117 HCHG RX CONTRAST REV CODE 255: Performed by: EMERGENCY MEDICINE

## 2025-03-05 PROCEDURE — 36415 COLL VENOUS BLD VENIPUNCTURE: CPT

## 2025-03-05 PROCEDURE — 83690 ASSAY OF LIPASE: CPT

## 2025-03-05 PROCEDURE — 96375 TX/PRO/DX INJ NEW DRUG ADDON: CPT

## 2025-03-05 RX ORDER — MORPHINE SULFATE 4 MG/ML
4 INJECTION INTRAVENOUS ONCE
Status: COMPLETED | OUTPATIENT
Start: 2025-03-05 | End: 2025-03-05

## 2025-03-05 RX ORDER — HYDROCODONE BITARTRATE AND ACETAMINOPHEN 5; 325 MG/1; MG/1
1 TABLET ORAL EVERY 6 HOURS PRN
Qty: 15 TABLET | Refills: 0 | Status: SHIPPED | OUTPATIENT
Start: 2025-03-05 | End: 2025-03-09

## 2025-03-05 RX ORDER — ONDANSETRON 2 MG/ML
4 INJECTION INTRAMUSCULAR; INTRAVENOUS ONCE
Status: COMPLETED | OUTPATIENT
Start: 2025-03-05 | End: 2025-03-05

## 2025-03-05 RX ORDER — SODIUM CHLORIDE 9 MG/ML
1000 INJECTION, SOLUTION INTRAVENOUS ONCE
Status: COMPLETED | OUTPATIENT
Start: 2025-03-05 | End: 2025-03-05

## 2025-03-05 RX ADMIN — IOHEXOL 80 ML: 350 INJECTION, SOLUTION INTRAVENOUS at 11:45

## 2025-03-05 RX ADMIN — SODIUM CHLORIDE 1000 ML: 9 INJECTION, SOLUTION INTRAVENOUS at 10:48

## 2025-03-05 RX ADMIN — ONDANSETRON 4 MG: 2 INJECTION INTRAMUSCULAR; INTRAVENOUS at 10:48

## 2025-03-05 RX ADMIN — MORPHINE SULFATE 4 MG: 4 INJECTION, SOLUTION INTRAMUSCULAR; INTRAVENOUS at 10:49

## 2025-03-05 ASSESSMENT — FIBROSIS 4 INDEX: FIB4 SCORE: 1.44

## 2025-03-05 NOTE — ED PROVIDER NOTES
ED Provider Note    CHIEF COMPLAINT  Chief Complaint   Patient presents with    Abdominal Pain     Pt believes he ate bad sausage last night. Now having generalized abdominal pain.     N/V       EXTERNAL RECORDS REVIEWED  Outpatient Notes   anesthesia, surgery    HPI/ROS  LIMITATION TO HISTORY   None  OUTSIDE HISTORIAN(S):  None    Blu Dill is a 79 y.o. male who presents here for evaluation of abdominal pain.  Patient states that he has been having nausea and vomiting over the last day.  He states he owed a bad sausage yesterday, thinks that that is the issue.  He states his abdominal pain is epigastric, nonradiating.  He has no back pain, or chest pain or shortness of breath.    PAST MEDICAL HISTORY   has a past medical history of CAD (coronary artery disease), Congestive heart failure (CHF) (HCC), Diverticulosis of colon, Heart burn, Hemorrhoids, High cholesterol, HTN (hypertension), Hyperlipidemia, Inguinal hernia, Rhinitis, Shoulder joint pain, Sinusitis, Sleep disturbance, Tobacco use, Trigeminal neuralgia, Vitamin B12 deficiency, and Vitamin D deficiency.    SURGICAL HISTORY   has a past surgical history that includes orif, ankle (Right, 1/18/2024).    FAMILY HISTORY  History reviewed. No pertinent family history.    SOCIAL HISTORY  Social History     Tobacco Use    Smoking status: Every Day     Current packs/day: 1.00     Types: Cigarettes    Smokeless tobacco: Never   Vaping Use    Vaping status: Not on file   Substance and Sexual Activity    Alcohol use: Yes     Comment: occ    Drug use: Never    Sexual activity: Not on file       CURRENT MEDICATIONS  Home Medications    **Home medications have not yet been reviewed for this encounter**       Audit from Redirected Encounters    **Home medications have not yet been reviewed for this encounter**         ALLERGIES  No Known Allergies    PHYSICAL EXAM  VITAL SIGNS: BP (!) 163/91   Pulse (!) 101   Temp 37.2 °C (98.9 °F) (Temporal)   Resp 18   Ht  "1.88 m (6' 2\")   Wt 90.7 kg (200 lb)   SpO2 92%   BMI 25.68 kg/m²    Constitutional: Well developed, well nourished. mild acute distress.  HEENT: Normocephalic, atraumatic. Posterior pharynx clear and moist.  Eyes:  EOMI. Normal sclera.  Neck: Supple, Full range of motion, nontender.  Chest/Pulmonary: clear to ausculation. Symmetrical expansion.   Cardio: Regular rate and rhythm with no murmur.   Abdomen: Soft, epigastric tenderness, No peritoneal signs. No guarding. No palpable masses.  Musculoskeletal: No deformity, no edema, neurovascular intact.   Neuro: Clear speech, appropriate, cooperative, cranial nerves II-XII grossly intact.  Psych: Normal mood and affect      EKG/LABS  Results for orders placed or performed during the hospital encounter of 03/05/25   CBC WITH DIFFERENTIAL    Collection Time: 03/05/25  9:43 AM   Result Value Ref Range    WBC 7.7 4.8 - 10.8 K/uL    RBC 4.95 4.70 - 6.10 M/uL    Hemoglobin 15.6 14.0 - 18.0 g/dL    Hematocrit 44.7 42.0 - 52.0 %    MCV 90.3 81.4 - 97.8 fL    MCH 31.5 27.0 - 33.0 pg    MCHC 34.9 32.3 - 36.5 g/dL    RDW 39.8 35.9 - 50.0 fL    Platelet Count 149 (L) 164 - 446 K/uL    MPV 9.7 9.0 - 12.9 fL    Neutrophils-Polys 94.40 (H) 44.00 - 72.00 %    Lymphocytes 3.40 (L) 22.00 - 41.00 %    Monocytes 1.20 0.00 - 13.40 %    Eosinophils 0.30 0.00 - 6.90 %    Basophils 0.30 0.00 - 1.80 %    Immature Granulocytes 0.40 0.00 - 0.90 %    Nucleated RBC 0.00 0.00 - 0.20 /100 WBC    Neutrophils (Absolute) 7.24 1.82 - 7.42 K/uL    Lymphs (Absolute) 0.26 (L) 1.00 - 4.80 K/uL    Monos (Absolute) 0.09 0.00 - 0.85 K/uL    Eos (Absolute) 0.02 0.00 - 0.51 K/uL    Baso (Absolute) 0.02 0.00 - 0.12 K/uL    Immature Granulocytes (abs) 0.03 0.00 - 0.11 K/uL    NRBC (Absolute) 0.00 K/uL   COMP METABOLIC PANEL    Collection Time: 03/05/25  9:43 AM   Result Value Ref Range    Sodium 136 135 - 145 mmol/L    Potassium 3.6 3.6 - 5.5 mmol/L    Chloride 103 96 - 112 mmol/L    Co2 17 (L) 20 - 33 mmol/L    " Anion Gap 16.0 7.0 - 16.0    Glucose 148 (H) 65 - 99 mg/dL    Bun 13 8 - 22 mg/dL    Creatinine 1.20 0.50 - 1.40 mg/dL    Calcium 8.8 8.5 - 10.5 mg/dL    Correct Calcium 8.7 8.5 - 10.5 mg/dL    AST(SGOT) 676 (H) 12 - 45 U/L    ALT(SGPT) 449 (H) 2 - 50 U/L    Alkaline Phosphatase 133 (H) 30 - 99 U/L    Total Bilirubin 2.8 (H) 0.1 - 1.5 mg/dL    Albumin 4.1 3.2 - 4.9 g/dL    Total Protein 6.5 6.0 - 8.2 g/dL    Globulin 2.4 1.9 - 3.5 g/dL    A-G Ratio 1.7 g/dL   LIPASE    Collection Time: 03/05/25  9:43 AM   Result Value Ref Range    Lipase 22 11 - 82 U/L   ESTIMATED GFR    Collection Time: 03/05/25  9:43 AM   Result Value Ref Range    GFR (CKD-EPI) 61 >60 mL/min/1.73 m 2   PLATELET ESTIMATE    Collection Time: 03/05/25  9:43 AM   Result Value Ref Range    Plt Estimation Decreased    URINALYSIS    Collection Time: 03/05/25 11:27 AM    Specimen: Urine   Result Value Ref Range    Micro Urine Req Microscopic          RADIOLOGY/PROCEDURES   I have independently interpreted the diagnostic imaging associated with this visit and am waiting the final reading from the radiologist.   My preliminary interpretation is as follows: below     Radiologist interpretation:  CT-ABDOMEN-PELVIS WITH   Final Result      1. No acute inflammatory process in the abdomen or pelvis.   2. Distended gallbladder with cholelithiasis.   3. Dilation of the common bile duct measuring up to 14 mm in diameter with intrahepatic biliary dilation. Further evaluation with MRCP is recommended.   4. Bilateral simple appearing renal cortical cysts, requiring no further imaging follow-up.   5. Atherosclerosis.          COURSE & MEDICAL DECISION MAKING    ASSESSMENT, COURSE AND PLAN  Care Narrative: This is a 79-year-old male here for evaluation of abdominal pain vomiting.  Patient has abnormal labs with regard to AST ALT and total bili.  Patient to come in for MRCP and further workup.    11:59 AM  Patient states that he would like to sign out because he has cats  that he needs to feed at home.  He will sign out AMA, and states that he will return for any further issues or concerns.  He understands the importance of doing so.      I discussed with the patient the risks of their decision to leave without receiving the appropriate medical care. I discussed with the patient the risks of their decision to refuse or withhold consent to receive appropriate medical care. The patient has the capacity to understand the risks and benefits described above. The patient is not intoxicated clinically, the patient's is alert and oriented and able to make a good decision in my opinion. I discussed alternative treatments with the patient. The patient was given discharge instructions and a followup plan as documented in the medical record. I have asked the patient to return at any time to the emergency department for any reason.      DISPOSITION AND DISCUSSIONS  I have discussed management of the patient with the following physicians and MEKA's:  none      FINAL DIAGNOSIS  Abdominal pain  AMA       Electronically signed by: Kristian New D.O., 3/5/2025 10:46 AM

## 2025-03-05 NOTE — ED TRIAGE NOTES
Blu Dill  79 y.o. male  Chief Complaint   Patient presents with    Abdominal Pain     Pt believes he ate bad sausage last night. Now having generalized abdominal pain.     N/V       Vitals:    03/05/25 0932   BP: (!) 163/91   Pulse: (!) 101   Resp: 18   Temp: 37.2 °C (98.9 °F)   SpO2: 92%       Patient educated on triage process and encouraged to alert staff of any changes in condition.    Pt BIBA from home with the above complaint. EMS gave 650mg tylenol and 4mg odt zofran.

## 2025-03-05 NOTE — ED NOTES
Discharge instructions reviewed with patient and signed. IV was removed from arm. Patient instructed on how to  prescriptions. They verbalized understanding of follow up instructions. All belongings with patient. Ambulate with a steady gait. AMA papers signed by patient

## 2025-03-05 NOTE — DISCHARGE INSTRUCTIONS
You have abnormal liver function tests, and elevated T bili.  You need an MRCP and surgical consult.

## (undated) DEVICE — TUBING CLEARLINK DUO-VENT - C-FLO (48EA/CA)

## (undated) DEVICE — DRAPE SURG STERI-DRAPE 7X11OD - (40EA/CA)

## (undated) DEVICE — SODIUM CHL IRRIGATION 0.9% 1000ML (12EA/CA)

## (undated) DEVICE — SUTURE GENERAL

## (undated) DEVICE — WATER IRRIGATION STERILE 1000ML (12EA/CA)

## (undated) DEVICE — GLOVE BIOGEL SZ 8 SURGICAL PF LTX - (50PR/BX 4BX/CA)

## (undated) DEVICE — PACK LOWER EXTREMITY - (2/CA)

## (undated) DEVICE — SLEEVE, VASO, THIGH, MED

## (undated) DEVICE — SET LEADWIRE 5 LEAD BEDSIDE DISPOSABLE ECG (1SET OF 5/EA)

## (undated) DEVICE — DRILL BIT 2.5 TWIST 310.25 (8TX2+1TX3=19)

## (undated) DEVICE — DRAPE U ORTHOPEDIC - (10/BX)

## (undated) DEVICE — GLOVE BIOGEL PI INDICATOR SZ 7.5 SURGICAL PF LF -(50/BX 4BX/CA)

## (undated) DEVICE — GLOVE BIOGEL INDICATOR SZ 7.5 SURGICAL PF LTX - (50PR/BX 4BX/CA)

## (undated) DEVICE — DRILL BIT 2.0 LONG 310.21 - (6TX212) SDSTB=2

## (undated) DEVICE — GOWN WARMING STANDARD FLEX - (30/CA)

## (undated) DEVICE — SUTURE 0 VICRYL PLUS CT-1 - 36 INCH (36/BX)

## (undated) DEVICE — DRAPE 36X28IN RAD CARM BND BG - (25/CA) O

## (undated) DEVICE — SUCTION INSTRUMENT YANKAUER BULBOUS TIP W/O VENT (50EA/CA)

## (undated) DEVICE — BANDAGE ELASTIC STERILE MATRIX 6 X 10 (20EA/CA)

## (undated) DEVICE — SET EXTENSION WITH 2 PORTS (48EA/CA) ***PART #2C8610 IS A SUBSTITUTE*****

## (undated) DEVICE — DRAPE LARGE 3 QUARTER - (20/CA)

## (undated) DEVICE — SYRINGE 30 ML LL (56/BX)

## (undated) DEVICE — CANISTER SUCTION 3000ML MECHANICAL FILTER AUTO SHUTOFF MEDI-VAC NONSTERILE LF DISP  (40EA/CA)

## (undated) DEVICE — DRAPE C ARMOR (12EA/CA)

## (undated) DEVICE — DRILL BIT 2.7 125MM 315.28 (5TX2+1TX1=11)

## (undated) DEVICE — GLOVE BIOGEL PI ORTHO SZ 7.5 PF LF (40PR/BX)

## (undated) DEVICE — COVER LIGHT HANDLE ALC PLUS DISP (18EA/BX)

## (undated) DEVICE — SUTURE 3-0 ETHILON FS-1 - (36/BX) 30 INCH

## (undated) DEVICE — COTTON ROLL 1 POUND BIOSEAL - (5RL/CA)

## (undated) DEVICE — PAD LAP STERILE 18 X 18 - (5/PK 40PK/CA)

## (undated) DEVICE — ELECTRODE DUAL RETURN W/ CORD - (50/PK)

## (undated) DEVICE — SENSOR OXIMETER ADULT SPO2 RD SET (20EA/BX)

## (undated) DEVICE — GLOVE SZ 7.5 BIOGEL PI MICRO - PF LF (50PR/BX)

## (undated) DEVICE — DRILL BIT SYN 2.8 165MM (5TX2+3TX1=13)

## (undated) DEVICE — SUTURE 2-0 VICRYL PLUS CT-1 36 (36PK/BX)"

## (undated) DEVICE — LACTATED RINGERS INJ 1000 ML - (14EA/CA 60CA/PF)

## (undated) DEVICE — SPLINT PLASTER 5 IN X 30 IN - (50EA/BX 6BX/CA)

## (undated) DEVICE — PADDING CAST 6 IN STERILE - 6 X 4 YDS (24/CA)

## (undated) DEVICE — BOVIE BLADE COATED - (50/PK)